# Patient Record
Sex: MALE | Race: WHITE | NOT HISPANIC OR LATINO | URBAN - NONMETROPOLITAN AREA
[De-identification: names, ages, dates, MRNs, and addresses within clinical notes are randomized per-mention and may not be internally consistent; named-entity substitution may affect disease eponyms.]

---

## 2018-04-18 PROBLEM — H01.021: Noted: 2018-04-18

## 2018-04-18 PROBLEM — H01.024: Noted: 2018-04-18

## 2019-04-12 ENCOUNTER — IMPORTED ENCOUNTER (OUTPATIENT)
Dept: URBAN - NONMETROPOLITAN AREA CLINIC 1 | Facility: CLINIC | Age: 64
End: 2019-04-12

## 2019-04-12 PROCEDURE — 92014 COMPRE OPH EXAM EST PT 1/>: CPT

## 2019-04-12 PROCEDURE — 92015 DETERMINE REFRACTIVE STATE: CPT

## 2019-04-12 NOTE — PATIENT DISCUSSION
Presbyopia OU- Discussed refractive status with patient- Continue to monitor- RTC 1 year complete [place] OU- Discussed diagnosis in detail with patient- Discussed with patient s/s of cataract progression- Discussed UV protection- No treatment needed at this time - Continue to monitorNIDDM - Discussed diagnosis in detail with patient- Discussed with patient importance of maintaining good blood sugar and diet- Recommend no soda's - No diabetic retinopathy send on toda's exam- Sending GP letter Edwin [place]- Continue to monitorBlepharitis OU - Discussed diagnosis in detail with patient- Recommend patient using J [employer] J baby shampoo to scrub lid daily- Continue to monitorPVD OD - Discussed findings of exam in detail with the patient. - The risk of retinal detachment in patients with PVDs was discussed with the patient and the warning signs of retinal detachment were carefully reviewed with the patient. - The patient was warned to return to the office or contact the ophthalmologist on call immediately if they experience signs of retinal detachment. - Continue to monitor; 's Notes: Gonio-VF-Optos-MR-4/13/17

## 2020-07-02 ENCOUNTER — IMPORTED ENCOUNTER (OUTPATIENT)
Dept: URBAN - NONMETROPOLITAN AREA CLINIC 1 | Facility: CLINIC | Age: 65
End: 2020-07-02

## 2020-07-02 PROCEDURE — 92014 COMPRE OPH EXAM EST PT 1/>: CPT

## 2020-07-02 PROCEDURE — 92015 DETERMINE REFRACTIVE STATE: CPT

## 2020-07-02 NOTE — PATIENT DISCUSSION
Presbyopia OU- Discussed refractive status with patient- New glasses RX given today- Continue to monitor- RTC 1 year complete [place] OU- Discussed diagnosis in detail with patient- Discussed with patient s/s of cataract progression- Discussed UV protection- No treatment needed at this time - Continue to monitorNIDDM - Discussed diagnosis in detail with patient- Discussed with patient importance of maintaining good blood sugar and diet- Recommend no soda's - No diabetic retinopathy send on today's exam but unable to get a good view of retina being patient does not wish to be dilated today- Sending GP letter June Cuevas- ***Recommended patient being dilated or having Optos done being diabetic and stressed the importance of being able to look at his retina but patient still defers dilation and Optos today 7/2/20***- Continue to monitorBlepharitis OU - Discussed diagnosis in detail with patient- Recommend patient using J & J baby shampoo to scrub lid daily- Continue to monitorPVD OD - Discussed findings of exam in detail with the patient. - The risk of retinal detachment in patients with PVDs was discussed with the patient and the warning signs of retinal detachment were carefully reviewed with the patient. - The patient was warned to return to the office or contact the ophthalmologist on call immediately if they experience signs of retinal detachment. - Continue to monitor; 's Notes: Gonio-VF-Optos-MR-4/13/17

## 2020-07-23 ENCOUNTER — IMPORTED ENCOUNTER (OUTPATIENT)
Dept: URBAN - NONMETROPOLITAN AREA CLINIC 1 | Facility: CLINIC | Age: 65
End: 2020-07-23

## 2020-07-23 NOTE — PATIENT DISCUSSION
RX check- Discussed diagnosis in detail with patient - Repeat MR done today by Dr. Tatiana Cervantes and will muralikae OD lens- Continue to monitor ---------------------------previous notes------------------------------Presbyopia OU- Discussed refractive status with patient- New glasses RX given today- Continue to monitor- RTC 1 year complete [place] OU- Discussed diagnosis in detail with patient- Discussed with patient s/s of cataract progression- Discussed UV protection- No treatment needed at this time - Continue to monitorNIDDM - Discussed diagnosis in detail with patient- Discussed with patient importance of maintaining good blood sugar and diet- Recommend no soda's - No diabetic retinopathy send on today's exam but unable to get a good view of retina being patient does not wish to be dilated today- Sending GP letter Minor - ***Recommended patient being dilated or having Optos done being diabetic and stressed the importance of being able to look at his retina but patient still defers dilation and Optos today 7/2/20***- Continue to monitorBlepharitis OU - Discussed diagnosis in detail with patient- Recommend patient using J & J baby shampoo to scrub lid daily- Continue to monitorPVD OD - Discussed findings of exam in detail with the patient. - The risk of retinal detachment in patients with PVDs was discussed with the patient and the warning signs of retinal detachment were carefully reviewed with the patient. - The patient was warned to return to the office or contact the ophthalmologist on call immediately if they experience signs of retinal detachment. - Continue to monitor; Dr's Notes: Gonio-VF-Optos-MR-4/13/17

## 2021-07-08 ENCOUNTER — IMPORTED ENCOUNTER (OUTPATIENT)
Dept: URBAN - NONMETROPOLITAN AREA CLINIC 1 | Facility: CLINIC | Age: 66
End: 2021-07-08

## 2021-07-08 PROBLEM — H01.021: Noted: 2018-04-18

## 2021-07-08 PROBLEM — H25.813: Noted: 2021-07-08

## 2021-07-08 PROBLEM — H01.024: Noted: 2018-04-18

## 2021-07-08 PROCEDURE — 92015 DETERMINE REFRACTIVE STATE: CPT

## 2021-07-08 PROCEDURE — 92014 COMPRE OPH EXAM EST PT 1/>: CPT

## 2021-07-08 NOTE — PATIENT DISCUSSION
Presbyopia OU- Discussed refractive status with patient- New glasses RX given today- Continue to monitor- RTC 1 year complete [place] OU- Discussed diagnosis in detail with patient- Discussed with patient s/s of cataract progression- Discussed UV protection- No treatment needed at this time - Continue to monitorNIDDM - Discussed diagnosis in detail with patient- Discussed with patient importance of maintaining good blood sugar and diet- Recommend no soda's - No diabetic retinopathy send on today's exam but patient defers dialtion today - Sending GP letter Minor - ***Recommended patient being dilated or having Optos done being diabetic and stressed the importance of being able to look at his retina but patient still defers dilation and Optos today 7/8/21***- Continue to monitorBlepharitis OU - Discussed diagnosis in detail with patient- Recommend patient using J & J baby shampoo to scrub lid daily- Continue to monitorPVD OD - Discussed findings of exam in detail with the patient. - The risk of retinal detachment in patients with PVDs was discussed with the patient and the warning signs of retinal detachment were carefully reviewed with the patient. - The patient was warned to return to the office or contact the ophthalmologist on call immediately if they experience signs of retinal detachment. - Continue to monitor; 's Notes: Gonio-VF-Optos-MR-4/13/17

## 2022-01-07 NOTE — PATIENT DISCUSSION
SIGNIFICANT PTOSIS BOTH UPPER EYELIDS; RECOMMEND LEVATOR ADVANCEMENT, OU.  I have examined the patient and reviewed the photos and visual fields.  The upper eyelid margin in ptosis obstructs the visual axis causing  impairment of the peripheral visual field which improves with taping.  I have discussed with the patient the option of levator advancement surgery to lift the upper eyelid position and improve the functional visual field.  We have discussed the risks and benefits of the surgery at length as well as the location of the incision and the recovery process.  The patient understands the surgery, has had all questions answered   and desires to proceed with the surgery as explained.

## 2022-01-07 NOTE — PATIENT DISCUSSION
SIGNIFICANT DERMATOCHALASIS BOTH UPPER EYELIDS; RECOMMEND UPPER EYELID BLEPHAROPLASTY, OU. I have examined the patient and reviewed  the photos and visual fields.  The excess upper eyelid tissue folds downward towards or onto the  lid margin causing impairment of the peripheral visual field.  The visual field improves by lifting this tissue which is supported by evidence of taped visual fields.  I have discussed with the patient the option of blepharoplasty surgery to improve the functional visual field.  We have discussed the risks and benefits of the surgery at length as well as the location of the incision and the recovery process.  The patient understands this discussion, has had all questions answered and desires to proceed with blepharoplasty surgery as explained.

## 2022-04-10 ASSESSMENT — VISUAL ACUITY
OD_CC: 20/25-
OS_SC: 20/20
OS_SC: 20/20-2
OS_CC: 20/20-2
OD_SC: 20/25
OS_SC: 20/20
OD_SC: 20/20-
OS_SC: 20/20
OD_SC: 20/20

## 2022-04-10 ASSESSMENT — PACHYMETRY
OD_CT_UM: 576; ADJ: THICK
OS_CT_UM: 584; ADJ: THICK
OD_CT_UM: 576; ADJ: THICK
OS_CT_UM: 584; ADJ: THICK
OD_CT_UM: 576; ADJ: THICK
OD_CT_UM: 576; ADJ: THICK

## 2022-04-10 ASSESSMENT — TONOMETRY
OD_IOP_MMHG: 24
OD_IOP_MMHG: 15
OS_IOP_MMHG: 18
OS_IOP_MMHG: 21
OD_IOP_MMHG: 20
OS_IOP_MMHG: 18

## 2022-07-11 ENCOUNTER — ESTABLISHED PATIENT (OUTPATIENT)
Dept: URBAN - NONMETROPOLITAN AREA CLINIC 1 | Facility: CLINIC | Age: 67
End: 2022-07-11

## 2022-07-11 DIAGNOSIS — H52.4: ICD-10-CM

## 2022-07-11 PROCEDURE — 92015 DETERMINE REFRACTIVE STATE: CPT

## 2022-07-11 PROCEDURE — 92014 COMPRE OPH EXAM EST PT 1/>: CPT

## 2022-07-11 ASSESSMENT — VISUAL ACUITY
OS_BAT: 20/29
OD_CC: 20/22
OD_BAT: 20/29
OS_CC: 20/20

## 2022-07-11 ASSESSMENT — TONOMETRY
OS_IOP_MMHG: 18
OD_IOP_MMHG: 19

## 2022-07-11 NOTE — PATIENT DISCUSSION
Discussed diagnosis in detail with patient. Discussed with patient importance of maintaining good blood sugar and diet. Recommend no soda's. No diabetic retinopathy send on today's exam. Sending GP notes [facility]. ***Recommended patient being dilated or having Optos done being diabetic and stressed the importance of being able to look at his retina but patient still defers dilation and Optos today 7/11/22***.

## 2022-07-27 NOTE — PATIENT DISCUSSION
Discussed the fact that if he wants an rx that accounts for intermediate he's going to need to graduate to a trifocal or a PAL (uses FT bifocal).

## 2024-07-11 ENCOUNTER — COMPREHENSIVE EXAM (OUTPATIENT)
Dept: URBAN - NONMETROPOLITAN AREA CLINIC 1 | Facility: CLINIC | Age: 69
End: 2024-07-11

## 2024-07-11 DIAGNOSIS — H25.813: ICD-10-CM

## 2024-07-11 DIAGNOSIS — H52.4: ICD-10-CM

## 2024-07-11 DIAGNOSIS — E11.9: ICD-10-CM

## 2024-07-11 PROCEDURE — 92015 DETERMINE REFRACTIVE STATE: CPT

## 2024-07-11 PROCEDURE — 99213 OFFICE O/P EST LOW 20 MIN: CPT

## 2024-07-11 ASSESSMENT — VISUAL ACUITY
OU_CC: 20/20
OD_CC: 20/20
OS_CC: 20/25-1

## 2024-07-11 ASSESSMENT — TONOMETRY
OS_IOP_MMHG: 19
OD_IOP_MMHG: 19

## 2025-07-18 ENCOUNTER — COMPREHENSIVE EXAM (OUTPATIENT)
Age: 70
End: 2025-07-18

## 2025-07-18 DIAGNOSIS — E11.9: ICD-10-CM

## 2025-07-18 DIAGNOSIS — H25.813: ICD-10-CM

## 2025-07-18 DIAGNOSIS — H43.811: ICD-10-CM

## 2025-07-18 DIAGNOSIS — H52.4: ICD-10-CM

## 2025-07-18 PROCEDURE — 92014 COMPRE OPH EXAM EST PT 1/>: CPT

## 2025-07-18 PROCEDURE — 92015 DETERMINE REFRACTIVE STATE: CPT
